# Patient Record
Sex: MALE | Race: BLACK OR AFRICAN AMERICAN | NOT HISPANIC OR LATINO | URBAN - METROPOLITAN AREA
[De-identification: names, ages, dates, MRNs, and addresses within clinical notes are randomized per-mention and may not be internally consistent; named-entity substitution may affect disease eponyms.]

---

## 2017-01-23 ENCOUNTER — EMERGENCY (EMERGENCY)
Facility: HOSPITAL | Age: 57
LOS: 1 days | Discharge: HOME | End: 2017-01-23
Attending: EMERGENCY MEDICINE | Admitting: EMERGENCY MEDICINE
Payer: COMMERCIAL

## 2017-01-23 VITALS
HEART RATE: 84 BPM | SYSTOLIC BLOOD PRESSURE: 116 MMHG | DIASTOLIC BLOOD PRESSURE: 74 MMHG | TEMPERATURE: 97 F | OXYGEN SATURATION: 98 % | RESPIRATION RATE: 16 BRPM

## 2017-01-23 DIAGNOSIS — R42 DIZZINESS AND GIDDINESS: ICD-10-CM

## 2017-01-23 DIAGNOSIS — R55 SYNCOPE AND COLLAPSE: ICD-10-CM

## 2017-01-23 DIAGNOSIS — Z79.899 OTHER LONG TERM (CURRENT) DRUG THERAPY: ICD-10-CM

## 2017-01-23 DIAGNOSIS — E11.9 TYPE 2 DIABETES MELLITUS WITHOUT COMPLICATIONS: ICD-10-CM

## 2017-01-23 LAB
ALBUMIN SERPL ELPH-MCNC: 3.8 G/DL — SIGNIFICANT CHANGE UP (ref 3.4–5)
ALP SERPL-CCNC: 95 U/L — SIGNIFICANT CHANGE UP (ref 40–120)
ALT FLD-CCNC: 116 U/L — HIGH (ref 12–42)
ANION GAP SERPL CALC-SCNC: 10 MMOL/L — SIGNIFICANT CHANGE UP (ref 9–16)
APPEARANCE UR: CLEAR — SIGNIFICANT CHANGE UP
AST SERPL-CCNC: 65 U/L — HIGH (ref 15–37)
BASOPHILS NFR BLD AUTO: 0.4 % — SIGNIFICANT CHANGE UP (ref 0–2)
BILIRUB SERPL-MCNC: 0.5 MG/DL — SIGNIFICANT CHANGE UP (ref 0.2–1.2)
BILIRUB UR-MCNC: NEGATIVE — SIGNIFICANT CHANGE UP
BUN SERPL-MCNC: 16 MG/DL — SIGNIFICANT CHANGE UP (ref 7–23)
CALCIUM SERPL-MCNC: 9 MG/DL — SIGNIFICANT CHANGE UP (ref 8.5–10.5)
CHLORIDE SERPL-SCNC: 100 MMOL/L — SIGNIFICANT CHANGE UP (ref 96–108)
CK SERPL-CCNC: 331 U/L — HIGH (ref 39–308)
CO2 SERPL-SCNC: 27 MMOL/L — SIGNIFICANT CHANGE UP (ref 22–31)
COLOR SPEC: YELLOW — SIGNIFICANT CHANGE UP
CREAT SERPL-MCNC: 1.15 MG/DL — SIGNIFICANT CHANGE UP (ref 0.5–1.3)
DIFF PNL FLD: NEGATIVE — SIGNIFICANT CHANGE UP
EOSINOPHIL NFR BLD AUTO: 1.5 % — SIGNIFICANT CHANGE UP (ref 0–6)
GLUCOSE SERPL-MCNC: 307 MG/DL — HIGH (ref 70–99)
GLUCOSE UR QL: >=1000
HCT VFR BLD CALC: 41 % — SIGNIFICANT CHANGE UP (ref 39–50)
HGB BLD-MCNC: 14.4 G/DL — SIGNIFICANT CHANGE UP (ref 13–17)
IMM GRANULOCYTES NFR BLD AUTO: 0.6 % — SIGNIFICANT CHANGE UP (ref 0–1.5)
KETONES UR-MCNC: NEGATIVE — SIGNIFICANT CHANGE UP
LEUKOCYTE ESTERASE UR-ACNC: NEGATIVE — SIGNIFICANT CHANGE UP
LYMPHOCYTES # BLD AUTO: 31.3 % — SIGNIFICANT CHANGE UP (ref 13–44)
MAGNESIUM SERPL-MCNC: 1.7 MG/DL — SIGNIFICANT CHANGE UP (ref 1.6–2.4)
MCHC RBC-ENTMCNC: 32.4 PG — SIGNIFICANT CHANGE UP (ref 27–34)
MCHC RBC-ENTMCNC: 35.1 G/DL — SIGNIFICANT CHANGE UP (ref 32–36)
MCV RBC AUTO: 92.1 FL — SIGNIFICANT CHANGE UP (ref 80–100)
MONOCYTES NFR BLD AUTO: 8.6 % — SIGNIFICANT CHANGE UP (ref 2–14)
NEUTROPHILS NFR BLD AUTO: 57.6 % — SIGNIFICANT CHANGE UP (ref 43–77)
NITRITE UR-MCNC: NEGATIVE — SIGNIFICANT CHANGE UP
NT-PROBNP SERPL-SCNC: 15 PG/ML — SIGNIFICANT CHANGE UP
PH UR: 5.5 — SIGNIFICANT CHANGE UP (ref 4–8.1)
PLATELET # BLD AUTO: 211 K/UL — SIGNIFICANT CHANGE UP (ref 150–400)
POTASSIUM SERPL-MCNC: 3.6 MMOL/L — SIGNIFICANT CHANGE UP (ref 3.5–5.3)
POTASSIUM SERPL-SCNC: 3.6 MMOL/L — SIGNIFICANT CHANGE UP (ref 3.5–5.3)
PROT SERPL-MCNC: 8.4 G/DL — HIGH (ref 6.4–8.2)
PROT UR-MCNC: 30 MG/DL
RBC # BLD: 4.45 M/UL — SIGNIFICANT CHANGE UP (ref 4.2–5.8)
RBC # FLD: 11.1 % — SIGNIFICANT CHANGE UP (ref 10.3–16.9)
SODIUM SERPL-SCNC: 137 MMOL/L — SIGNIFICANT CHANGE UP (ref 132–145)
SP GR SPEC: 1.02 — SIGNIFICANT CHANGE UP (ref 1–1.03)
TROPONIN I SERPL-MCNC: <0.017 NG/ML — LOW (ref 0.02–0.06)
UROBILINOGEN FLD QL: 0.2 E.U./DL — SIGNIFICANT CHANGE UP
WBC # BLD: 11.7 K/UL — HIGH (ref 3.8–10.5)
WBC # FLD AUTO: 11.7 K/UL — HIGH (ref 3.8–10.5)

## 2017-01-23 PROCEDURE — 99284 EMERGENCY DEPT VISIT MOD MDM: CPT

## 2017-01-23 PROCEDURE — 93010 ELECTROCARDIOGRAM REPORT: CPT | Mod: 77

## 2017-01-23 PROCEDURE — 93010 ELECTROCARDIOGRAM REPORT: CPT

## 2017-01-23 PROCEDURE — 99220: CPT | Mod: 25

## 2017-01-23 PROCEDURE — 70496 CT ANGIOGRAPHY HEAD: CPT | Mod: 26

## 2017-01-23 PROCEDURE — 71020: CPT | Mod: 26

## 2017-01-23 PROCEDURE — 70450 CT HEAD/BRAIN W/O DYE: CPT | Mod: 26,59

## 2017-01-23 RX ORDER — SODIUM CHLORIDE 9 MG/ML
1000 INJECTION INTRAMUSCULAR; INTRAVENOUS; SUBCUTANEOUS ONCE
Qty: 0 | Refills: 0 | Status: COMPLETED | OUTPATIENT
Start: 2017-01-23 | End: 2017-01-23

## 2017-01-23 RX ORDER — ONDANSETRON 8 MG/1
4 TABLET, FILM COATED ORAL ONCE
Qty: 0 | Refills: 0 | Status: COMPLETED | OUTPATIENT
Start: 2017-01-23 | End: 2017-01-23

## 2017-01-23 RX ORDER — KETOROLAC TROMETHAMINE 30 MG/ML
30 SYRINGE (ML) INJECTION ONCE
Qty: 0 | Refills: 0 | Status: DISCONTINUED | OUTPATIENT
Start: 2017-01-23 | End: 2017-01-23

## 2017-01-23 RX ADMIN — Medication 30 MILLIGRAM(S): at 22:05

## 2017-01-23 RX ADMIN — ONDANSETRON 4 MILLIGRAM(S): 8 TABLET, FILM COATED ORAL at 18:49

## 2017-01-23 RX ADMIN — Medication 30 MILLIGRAM(S): at 22:04

## 2017-01-23 RX ADMIN — SODIUM CHLORIDE 500 MILLILITER(S): 9 INJECTION INTRAMUSCULAR; INTRAVENOUS; SUBCUTANEOUS at 18:49

## 2017-01-23 RX ADMIN — SODIUM CHLORIDE 500 MILLILITER(S): 9 INJECTION INTRAMUSCULAR; INTRAVENOUS; SUBCUTANEOUS at 22:04

## 2017-01-23 NOTE — ED PROVIDER NOTE - MEDICAL DECISION MAKING DETAILS
near syncope, LH, no vertigo sx, mild nausea w/o vomiting, denies any pain, no cp, no sob, no hx of CAD or PE, will check labs, CT, fluids, reassess

## 2017-01-23 NOTE — ED PROVIDER NOTE - PROGRESS NOTE DETAILS
pt reevaluated after 2 L fluids, felt nauseous, sat upright, became unresponsive and passed out.  on bedside monitor pt HR down to 20-30s, appears to be in sinus, sx ~30 seconds, after which pt came about, ao x 3, does not recall what happened.  repeat EKG at bedside was sinus in 70s.  no sz activities in the ED, no hx of sz d/o. d/w transfer center and Dr. Cartagena, agrees reasonable for admission to cardiology w/ EP eval in the AM, however, there is no monitored bed available, and wants [facility] and have cardiology assess pt in the AM for disposition planning. possible monitored bed available, d/w Dr. Cartagena via transfer center, however, felt pt does need admission at the moment, and should continue obs at [facility] for cardiology in the AM, and per transfer center, no available monitored beds.

## 2017-01-23 NOTE — ED ADULT NURSE REASSESSMENT NOTE - NS ED NURSE REASSESS COMMENT FT1
Pt. had episode of near syncope while lying in his bed. Pt. wife reports pt. used the urinal and then began complaining of feeling "bad". Pt. became very lethargic and vomited. Pt. medicated for nausea and vomiting. Repeat Trop sent, repeat EKG and fingerstick completed. Pt. is awake but still lethargic and feeling very weak. Remains on continuous cardiac monitoring with O2via nasal cannula.

## 2017-01-23 NOTE — ED PROVIDER NOTE - PHYSICAL EXAMINATION
CON: pt had his eyes closed but answers questions, will open eyes when asked to, states feels weak, HENMT: clear oropharynx, soft neck, HEAD: atraumatic, CV: rrr, equal pulses b/l, RESP: cta b/l, GI: +BS, soft, nontender, no rebound, no guarding, SKIN: no rash, MSK: moving all extremities spontaneously, NEURO: limited assessment 2/2 general weakness

## 2017-01-23 NOTE — ED PROVIDER NOTE - OBJECTIVE STATEMENT
56 yom pw sudden onset of feeling LH, near syncope today at work, 430pm, accompanied by wife at bedside.  pt was at work (), felt sudden onset of sx, w/ nausea, no headache, no cp, no sob, no 56 yom pw sudden onset of feeling LH, near syncope today at work, 430pm, accompanied by wife at bedside.  pt was at work (), felt sudden onset of sx, w/ nausea, no headache, no cp, no sob, no vomiting, called wife.  hx of diabetes, on metformin.

## 2017-01-24 VITALS
HEART RATE: 74 BPM | RESPIRATION RATE: 18 BRPM | DIASTOLIC BLOOD PRESSURE: 60 MMHG | TEMPERATURE: 99 F | SYSTOLIC BLOOD PRESSURE: 113 MMHG | OXYGEN SATURATION: 95 %

## 2017-01-24 LAB
CK SERPL-CCNC: 266 U/L — SIGNIFICANT CHANGE UP (ref 39–308)
COHGB MFR BLDA: 1.2 % — SIGNIFICANT CHANGE UP
D DIMER BLD IA.RAPID-MCNC: 176 NG/ML DDU — SIGNIFICANT CHANGE UP
HGB BLDA-MCNC: 14 G/DL — SIGNIFICANT CHANGE UP (ref 13–17)
METHGB MFR BLDA: 1.1 % — SIGNIFICANT CHANGE UP
O2 CT VFR BLDA CALC: SIGNIFICANT CHANGE UP (ref 15–23)
OXYHGB MFR BLDA: 14 % — LOW (ref 94–100)
PCO2 BLDA: 53 MMHG — HIGH (ref 35–48)
PH BLDA: 7.33 — LOW (ref 7.35–7.45)
PO2 BLDA: 34 MMHG — CRITICAL LOW (ref 83–108)
SAO2 % BLDA: 62 % — LOW (ref 95–100)
TROPONIN I SERPL-MCNC: <0.017 NG/ML — LOW (ref 0.02–0.06)
TROPONIN I SERPL-MCNC: <0.017 NG/ML — LOW (ref 0.02–0.06)

## 2017-01-24 PROCEDURE — 71020: CPT | Mod: 26

## 2017-01-24 PROCEDURE — 99217: CPT

## 2017-01-24 RX ORDER — METFORMIN HYDROCHLORIDE 850 MG/1
1 TABLET ORAL
Qty: 0 | Refills: 0 | COMMUNITY

## 2017-01-24 RX ORDER — AZITHROMYCIN 500 MG/1
1 TABLET, FILM COATED ORAL
Qty: 4 | Refills: 0 | OUTPATIENT
Start: 2017-01-24 | End: 2017-01-28

## 2017-01-24 RX ORDER — AZITHROMYCIN 500 MG/1
500 TABLET, FILM COATED ORAL ONCE
Qty: 0 | Refills: 0 | Status: COMPLETED | OUTPATIENT
Start: 2017-01-24 | End: 2017-01-24

## 2017-01-24 RX ADMIN — AZITHROMYCIN 500 MILLIGRAM(S): 500 TABLET, FILM COATED ORAL at 11:00

## 2017-01-24 NOTE — ED CDU PROVIDER NOTE - MEDICAL DECISION MAKING DETAILS
repeat troponin, and cardiology consult repeat troponin, and cardiology consult    I have discussed the discharge plan with the patient. The patient agrees with the plan, as discussed.  The patient understands Emergency Department diagnosis is a preliminary diagnosis often based on limited information and that the patient must adhere to the follow-up plan as discussed.  The patient understands that if the symptoms worsen or if prescribed medications do not have the desired/planned effect that the patient may return to the Emergency Department at any time for further evaluation and treatment.

## 2017-01-24 NOTE — ED ADULT NURSE REASSESSMENT NOTE - NS ED NURSE REASSESS COMMENT FT1
repeat labs drawn and sent.  no complaints noted.  resps even and unlabored.  cardiac monitor = nsr with no ectopy.  family asleep at bedside.

## 2017-01-24 NOTE — CONSULT NOTE ADULT - SUBJECTIVE AND OBJECTIVE BOX
[facility] ([facility]) ER CARDIOLOGY CONSULT NOTE    Patient is a 56y old  Male who presents with a chief complaint of near syncope    HPI:56y Male with past medical history of DM-2 p/w episode of near syncope.  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  Afterwards, he began to feel lightheaded and also had some chest tightness.  He denied any syncope or SOB.  He was brought to [facility] ER and EKG initially showed NSR at 64 bpm with 1st degree AV block and non specific ST/T wave abnormalities with a BP of 116/74.  He received 2 L of IVF, however, he continued to feel lightheaded.  He had 2 separate episodes of nausea that were followed by syncope (brief about 30 seconds- no seizure activity).  One episode was accompanied by sinus bradycardia on the monitor down to a HR of 20s-30s per ER staff (no tele strips available).  Pt also noted to have decreased O2 sats to 88% when sleeping/snoring.      Pt now feels tired, but overall better than yesterday.  Denies any prior cardiac testing.  Denies any limitations walking, although he does not exercise.    REVIEW OF SYSTEMS: Denies palpitations, change in exercise tolerance, orthopnea, PND, claudication, or edema    PAST MEDICAL & SURGICAL HISTORY:  Diabetes  Ventral hernia    No Known Drug Allergies    MEDICATIONS  (home):  metformin 500 mg PO bid    SOCIAL HISTORY:  Tobacco: none  Alcohol: none  Drugs: none  Occupation:     FAMILY HISTORY:  No pertinent family history in first degree relatives    Vital Signs Last 24 Hrs  T(C): 37.1, Max: 37.1 ( @ 06:35)  T(F): 98.8, Max: 98.8 ( @ 06:35)  HR: 87 (68 - 98)  BP: 130/78 (114/60 - 155/86)  BP(mean): --  RR: 18 (16 - 18)  SpO2: 98% (94% - 99%)  PHYSICAL EXAM:  General: NAD, sleepy but easily arousable  HEENT: NCAT, MMM, no icterus  Neck: no JVD, no carotid bruits  Cardiovascular: S1 quiet S2 loud, RRR, no MRG  Lungs: CTABL, no WRR  Abdominal: +BS, obese, distended, soft, non-tender, no RGR  Extremities: warm, dry, pulses 2+ x 4 ext, no varicosities  Skin: no rash, bruising, or bleeding  Neuro: AAOx3, no FND    ECG: NSR with sinus arrhythmia 64 bpm, 1st deg AVB, non spec ST/T wave abnormalities.  #2 NSR 73 bpm 1st deg AVB, non spec ST/T wave abnormalities  TELEMETRY: no events   BEDSIDE ECHO FINDINGS:  EF 55-60%, technically difficult study, no gross wall motion abnormalities, no gross valve abnormalities, no pericardial effusion, IVC not visualized  LABS:                        14.4   11.7  )-----------( 211      ( 2017 18:45 )             41.0     2017 18:45    137    |  100    |  16     ----------------------------<  307    3.6     |  27     |  1.15     Ca    9.0        2017 18:45  Mg     1.7       2017 18:45    TPro  8.4    /  Alb  3.8    /  TBili  0.5    /  DBili  x      /  AST  65     /  ALT  116    /  AlkPhos  95     2017 18:45    CARDIAC MARKERS ( 2017 05:20 )  <0.017 ng/mL / x     / 266 U/L / x     / x      CARDIAC MARKERS ( 2017 00:01 )  <0.017 ng/mL / x     / x     / x     / x      CARDIAC MARKERS ( 2017 18:45 )  <0.017 ng/mL / x     / 331 U/L / x     / x        Urinalysis Basic - ( 2017 22:10 )    Color: Yellow / Appearance: Clear / S.020 / pH: x  Gluc: x / Ketone: NEGATIVE  / Bili: NEGATIVE / Urobili: 0.2 E.U./dL   Blood: x / Protein: 30 mg/dL / Nitrite: NEGATIVE   Leuk Esterase: NEGATIVE / RBC: < 5 /HPF / WBC < 5 /HPF   Sq Epi: x / Non Sq Epi: Few /HPF / Bacteria: Present /HPF    RADIOLOGY & ADDITIONAL STUDIES:    PRIOR CARDIAC TESTING: denies    ASSESSMENT:  56y Male with past medical history of DM-2 p/w episode of near syncope/syncope    PLAN:  1.  Syncope - likely vasovagal in nature given nausea, and sinus bradycardia seen by ER staff during episode.  Recommend rest, IVF, PO intake as tolerated.  Patient should f/u with me in 1 week and will plan to pursue formal echo as outpatient.    2.  Chest tightness - troponin <0.017 x 3, doubt ACS.  3.  Hypoxia during sleep - may represent obstructive sleep apnea, pt admits to daytime somnolence.  Will refer for evaluation with Dr. Ramesh (ENT) for sleep evaluation as outpatient.  Check CXR PA/lateral.  4.  Diabetes - followed by patient's primary MD.  5.  Transaminitis - follow up with primary MD. [facility] ([facility]) ER CARDIOLOGY CONSULT NOTE    Patient is a 56y old  Male who presents with a chief complaint of near syncope    HPI:56y Male with past medical history of DM-2 p/w episode of near syncope.  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  Afterwards, he began to feel lightheaded and also had some chest tightness.  He denied any syncope or SOB.  He was brought to [facility] ER and EKG initially showed NSR at 64 bpm with 1st degree AV block and non specific ST/T wave abnormalities with a BP of 116/74.  He received 2 L of IVF, however, he continued to feel lightheaded.  He had 2 separate episodes of nausea that were followed by syncope (brief about 30 seconds- no seizure activity).  One episode was accompanied by sinus bradycardia on the monitor down to a HR of 20s-30s per ER staff (no tele strips available).  Pt also noted to have decreased O2 sats to 88% when sleeping/snoring.      Pt now feels tired, but overall better than yesterday.  Denies any prior cardiac testing.  Denies any limitations walking, although he does not exercise.    REVIEW OF SYSTEMS: Denies palpitations, change in exercise tolerance, orthopnea, PND, claudication, or edema    PAST MEDICAL & SURGICAL HISTORY:  Diabetes  Ventral hernia    No Known Drug Allergies    MEDICATIONS  (home):  metformin 500 mg PO bid    SOCIAL HISTORY:  Tobacco: none  Alcohol: none  Drugs: none  Occupation:     FAMILY HISTORY:  No pertinent family history in first degree relatives    Vital Signs Last 24 Hrs  T(C): 37.1, Max: 37.1 ( @ 06:35)  T(F): 98.8, Max: 98.8 ( @ 06:35)  HR: 87 (68 - 98)  BP: 130/78 (114/60 - 155/86)  BP(mean): --  RR: 18 (16 - 18)  SpO2: 98% (94% - 99%)  PHYSICAL EXAM:  General: NAD, sleepy but easily arousable  HEENT: NCAT, MMM, no icterus  Neck: no JVD, no carotid bruits  Cardiovascular: S1 quiet S2 loud, RRR, no MRG  Lungs: CTABL, no WRR  Abdominal: +BS, obese, distended, soft, non-tender, no RGR  Extremities: warm, dry, pulses 2+ x 4 ext, no varicosities  Skin: no rash, bruising, or bleeding  Neuro: AAOx3, no FND    ECG: NSR with sinus arrhythmia 64 bpm, 1st deg AVB, non spec ST/T wave abnormalities.  #2 NSR 73 bpm 1st deg AVB, non spec ST/T wave abnormalities  TELEMETRY: no events   BEDSIDE ECHO FINDINGS:  EF 55-60%, technically difficult study, no gross wall motion abnormalities, no gross valve abnormalities, no pericardial effusion, IVC not visualized  LABS:                        14.4   11.7  )-----------( 211      ( 2017 18:45 )             41.0     2017 18:45    137    |  100    |  16     ----------------------------<  307    3.6     |  27     |  1.15     Ca    9.0        2017 18:45  Mg     1.7       2017 18:45    TPro  8.4    /  Alb  3.8    /  TBili  0.5    /  DBili  x      /  AST  65     /  ALT  116    /  AlkPhos  95     2017 18:45    CARDIAC MARKERS ( 2017 05:20 )  <0.017 ng/mL / x     / 266 U/L / x     / x      CARDIAC MARKERS ( 2017 00:01 )  <0.017 ng/mL / x     / x     / x     / x      CARDIAC MARKERS ( 2017 18:45 )  <0.017 ng/mL / x     / 331 U/L / x     / x        Urinalysis Basic - ( 2017 22:10 )    Color: Yellow / Appearance: Clear / S.020 / pH: x  Gluc: x / Ketone: NEGATIVE  / Bili: NEGATIVE / Urobili: 0.2 E.U./dL   Blood: x / Protein: 30 mg/dL / Nitrite: NEGATIVE   Leuk Esterase: NEGATIVE / RBC: < 5 /HPF / WBC < 5 /HPF   Sq Epi: x / Non Sq Epi: Few /HPF / Bacteria: Present /HPF    RADIOLOGY & ADDITIONAL STUDIES: CT head : normal, CT angio brain: no hemodynamically significant stenosis    PRIOR CARDIAC TESTING: denies    ASSESSMENT:  56y Male with past medical history of DM-2 p/w episode of near syncope/syncope    PLAN:  1.  Syncope - likely vasovagal in nature given nausea, and sinus bradycardia seen by ER staff during episode.  Recommend rest, IVF, PO intake as tolerated.  Patient should f/u with me in 1 week and will plan to pursue formal echo as outpatient.  CT head unremarkable.  2.  Chest tightness - troponin <0.017 x 3, doubt ACS.  3.  Hypoxia during sleep - may represent obstructive sleep apnea, pt admits to daytime somnolence.  Will refer for evaluation with Dr. Ramesh (ENT) for sleep evaluation as outpatient.  Check CXR PA/lateral.  4.  Diabetes - followed by patient's primary MD.  5.  Transaminitis - follow up with primary MD. [facility] ([facility]) ER CARDIOLOGY CONSULT NOTE    Patient is a 56y old  Male who presents with a chief complaint of near syncope    HPI:56y Male with past medical history of DM-2 p/w episode of near syncope.  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  Afterwards, he began to feel lightheaded and also had some chest tightness.  He denied any syncope or SOB.  He was brought to [facility] ER and EKG initially showed NSR at 64 bpm with 1st degree AV block and non specific ST/T wave abnormalities with a BP of 116/74.  He received 2 L of IVF, however, he continued to feel lightheaded.  He had 2 separate episodes of nausea that were followed by syncope (brief about 30 seconds- no seizure activity).  One episode was accompanied by sinus bradycardia on the monitor down to a HR of 20s-30s per ER staff (no tele strips available).  Pt also noted to have decreased O2 sats to 88% when sleeping/snoring.      Pt now feels tired, but overall better than yesterday.  Denies any prior cardiac testing.  Denies any limitations walking, although he does not exercise.    REVIEW OF SYSTEMS: Denies palpitations, change in exercise tolerance, orthopnea, PND, claudication, or edema    PAST MEDICAL & SURGICAL HISTORY:  Diabetes  Ventral hernia    No Known Drug Allergies    MEDICATIONS  (home):  metformin 500 mg PO bid    SOCIAL HISTORY:  Tobacco: none  Alcohol: none  Drugs: none  Occupation:     FAMILY HISTORY:  No pertinent family history in first degree relatives    Vital Signs Last 24 Hrs  T(C): 37.1, Max: 37.1 ( @ 06:35)  T(F): 98.8, Max: 98.8 ( @ 06:35)  HR: 87 (68 - 98)  BP: 130/78 (114/60 - 155/86)  BP(mean): --  RR: 18 (16 - 18)  SpO2: 98% (94% - 99%)  PHYSICAL EXAM:  General: NAD, sleepy but easily arousable  HEENT: NCAT, MMM, no icterus  Neck: no JVD, no carotid bruits  Cardiovascular: S1 quiet S2 loud, RRR, no MRG  Lungs: CTABL, no WRR  Abdominal: +BS, obese, distended, soft, non-tender, no RGR  Extremities: warm, dry, pulses 2+ x 4 ext, no varicosities  Skin: no rash, bruising, or bleeding  Neuro: AAOx3, no FND    ECG: NSR with sinus arrhythmia 64 bpm, 1st deg AVB, non spec ST/T wave abnormalities.  #2 NSR 73 bpm 1st deg AVB, non spec ST/T wave abnormalities  TELEMETRY: no events   BEDSIDE ECHO FINDINGS:  EF 55-60%, technically difficult study, no gross wall motion abnormalities, no gross valve abnormalities, no pericardial effusion, IVC not visualized  LABS:                        14.4   11.7  )-----------( 211      ( 2017 18:45 )             41.0     2017 18:45    137    |  100    |  16     ----------------------------<  307    3.6     |  27     |  1.15     Ca    9.0        2017 18:45  Mg     1.7       2017 18:45    TPro  8.4    /  Alb  3.8    /  TBili  0.5    /  DBili  x      /  AST  65     /  ALT  116    /  AlkPhos  95     2017 18:45    CARDIAC MARKERS ( 2017 05:20 )  <0.017 ng/mL / x     / 266 U/L / x     / x      CARDIAC MARKERS ( 2017 00:01 )  <0.017 ng/mL / x     / x     / x     / x      CARDIAC MARKERS ( 2017 18:45 )  <0.017 ng/mL / x     / 331 U/L / x     / x        Urinalysis Basic - ( 2017 22:10 )    Color: Yellow / Appearance: Clear / S.020 / pH: x  Gluc: x / Ketone: NEGATIVE  / Bili: NEGATIVE / Urobili: 0.2 E.U./dL   Blood: x / Protein: 30 mg/dL / Nitrite: NEGATIVE   Leuk Esterase: NEGATIVE / RBC: < 5 /HPF / WBC < 5 /HPF   Sq Epi: x / Non Sq Epi: Few /HPF / Bacteria: Present /HPF    RADIOLOGY & ADDITIONAL STUDIES: CT head : normal, CT angio brain: no hemodynamically significant stenosis    PRIOR CARDIAC TESTING: denies    ASSESSMENT:  56y Male with past medical history of DM-2 p/w episode of near syncope/syncope    PLAN:  1.  Syncope - likely vasovagal in nature given nausea, and sinus bradycardia seen by ER staff during episode.  Recommend rest, IVF, PO intake as tolerated.  Patient should f/u with me in 1 week and will plan to pursue formal echo as outpatient.  CT head unremarkable.  2.  Chest tightness - troponin <0.017 x 3, doubt ACS.  3.  Hypoxia during sleep - sats to 88% on pulse ox in ER while sleeping.  May represent obstructive sleep apnea, pt admits to daytime somnolence.  Will refer for evaluation with Dr. Ramesh (ENT) for sleep evaluation as outpatient.  Check CXR PA/lateral.  4.  Diabetes - followed by patient's primary MD, on metformin.  5.  Transaminitis - no abdominal pain.  Follow up with primary MD for repeat LFTs. [facility] ([facility]) ER CARDIOLOGY CONSULT NOTE    Patient is a 56y old  Male who presents with a chief complaint of near syncope    HPI:56y Male with past medical history of DM-2 p/w episode of near syncope.  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  Afterwards, he began to feel lightheaded and also had some chest tightness.  He denied any syncope or SOB.  He was brought to [facility] ER and EKG initially showed NSR at 64 bpm with 1st degree AV block and non specific ST/T wave abnormalities with a BP of 116/74.  He received 2 L of IVF, however, he continued to feel lightheaded.  He had 2 separate episodes of nausea that were followed by syncope (brief about 30 seconds- no seizure activity).  One episode was accompanied by sinus bradycardia on the monitor down to a HR of 20s-30s per ER staff (no tele strips available).  Pt also noted to have decreased O2 sats to 88% when sleeping/snoring.      Pt now feels tired, but overall better than yesterday.  Denies any prior cardiac testing.  Denies any limitations walking, although he does not exercise.    REVIEW OF SYSTEMS: Denies palpitations, change in exercise tolerance, orthopnea, PND, claudication, or edema    PAST MEDICAL & SURGICAL HISTORY:  Diabetes  Ventral hernia    No Known Drug Allergies    MEDICATIONS  (home):  metformin 500 mg PO bid    SOCIAL HISTORY:  Tobacco: none  Alcohol: none  Drugs: none  Occupation:     FAMILY HISTORY:  No pertinent family history in first degree relatives    Vital Signs Last 24 Hrs  T(C): 37.1, Max: 37.1 ( @ 06:35)  T(F): 98.8, Max: 98.8 ( @ 06:35)  HR: 87 (68 - 98)  BP: 130/78 (114/60 - 155/86)  BP(mean): --  RR: 18 (16 - 18)  SpO2: 98% (94% - 99%)  PHYSICAL EXAM:  General: NAD, sleepy but easily arousable  HEENT: NCAT, MMM, no icterus  Neck: no JVD, no carotid bruits  Cardiovascular: S1 quiet S2 loud, RRR, no MRG  Lungs: CTABL, no WRR  Abdominal: +BS, obese, distended, soft, non-tender, no RGR  Extremities: warm, dry, pulses 2+ x 4 ext, no varicosities  Skin: no rash, bruising, or bleeding  Neuro: AAOx3, no FND    ECG: NSR with sinus arrhythmia 64 bpm, 1st deg AVB, non spec ST/T wave abnormalities.  #2 NSR 73 bpm 1st deg AVB, non spec ST/T wave abnormalities  TELEMETRY: no events   BEDSIDE ECHO FINDINGS:  EF 55-60%, technically difficult study, no gross wall motion abnormalities, no gross valve abnormalities, no pericardial effusion, IVC not visualized  LABS:                        14.4   11.7  )-----------( 211      ( 2017 18:45 )             41.0     2017 18:45    137    |  100    |  16     ----------------------------<  307    3.6     |  27     |  1.15     Ca    9.0        2017 18:45  Mg     1.7       2017 18:45    TPro  8.4    /  Alb  3.8    /  TBili  0.5    /  DBili  x      /  AST  65     /  ALT  116    /  AlkPhos  95     2017 18:45    CARDIAC MARKERS ( 2017 05:20 )  <0.017 ng/mL / x     / 266 U/L / x     / x      CARDIAC MARKERS ( 2017 00:01 )  <0.017 ng/mL / x     / x     / x     / x      CARDIAC MARKERS ( 2017 18:45 )  <0.017 ng/mL / x     / 331 U/L / x     / x        Urinalysis Basic - ( 2017 22:10 )    Color: Yellow / Appearance: Clear / S.020 / pH: x  Gluc: x / Ketone: NEGATIVE  / Bili: NEGATIVE / Urobili: 0.2 E.U./dL   Blood: x / Protein: 30 mg/dL / Nitrite: NEGATIVE   Leuk Esterase: NEGATIVE / RBC: < 5 /HPF / WBC < 5 /HPF   Sq Epi: x / Non Sq Epi: Few /HPF / Bacteria: Present /HPF    RADIOLOGY & ADDITIONAL STUDIES: CT head : normal, CT angio brain: no hemodynamically significant stenosis    PRIOR CARDIAC TESTING: denies    ASSESSMENT:  56y Male with past medical history of DM-2 p/w episode of near syncope/syncope    PLAN:  1.  Syncope - likely vasovagal in nature given nausea, and sinus bradycardia seen by ER staff during episode.  Recommend rest, IVF, PO intake as tolerated.  Patient should f/u with me in 1 week and will plan to pursue formal echo as outpatient.  CT head unremarkable.  2.  Chest tightness - troponin <0.017 x 3, doubt ACS.  Check lipids as outpatient, and will likely recommend aspirin 81 mg PO qd for primary prevention.  3.  Hypoxia during sleep - sats to 88% on pulse ox in ER while sleeping.  May represent obstructive sleep apnea, pt admits to daytime somnolence.  Will refer for evaluation with Dr. Ramesh (ENT) for sleep evaluation as outpatient.  Check CXR PA/lateral.  4.  Diabetes - followed by patient's primary MD, on metformin.  5.  Transaminitis - no abdominal pain.  Follow up with primary MD for repeat LFTs.

## 2017-01-24 NOTE — ED CDU PROVIDER NOTE - PROGRESS NOTE DETAILS
repeat trop and ekg wnl. Sign out accepted from Dr. Lowe.  Pt is being re-evaluated by Dr. Armenta of cardiology at bedside now.  Will f/u cardiology recommendations and re-evaluate pt.  Cards reports overnight tele was uneventful. Pt evaluated by Dr. Armenta.  he believes pt can be discharged home.   He would like a CXR for completion sake and to r/o any early PNA.  Pt was walked and had no further SOB, CP, or dizziness.  Pt reports he feels much better and is comfortable with D/C plan.  He will f/u with Dr. Armenta on Tuesday. CXR - possible atelectasis vs early consolidation to RLL - discussed with radiologist.  Given episode yesterday will treat with Azithromycin and have him f/u for repeat CXR with Dr. Armenta.

## 2017-01-24 NOTE — ED CDU PROVIDER NOTE - OBJECTIVE STATEMENT
56 yom pw sudden onset of feeling LH, near syncope today at work, 430pm, accompanied by wife at bedside.  pt was at work (), felt sudden onset of sx, w/ nausea, no headache, no cp, no sob, no vomiting, called wife.  hx of diabetes, on metformin.

## 2020-01-29 NOTE — ED CDU PROVIDER NOTE - PHYSICAL EXAMINATION
Detail Level: Simple CON: pt had his eyes closed but answers questions, will open eyes when asked to, states feels weak, HENMT: clear oropharynx, soft neck, HEAD: atraumatic, CV: rrr, equal pulses b/l, RESP: cta b/l, GI: +BS, soft, nontender, no rebound, no guarding, SKIN: no rash, MSK: no edema noted, NEURO: ao x 3, follows commands, conversing appropriately, moving all extremities spontaneously,

## 2021-08-19 NOTE — ED ADULT NURSE NOTE - CHIEF COMPLAINT QUOTE
Pt with dizziness at work, no facial droop, no slurred speech Patient/Caregiver provided printed discharge information.

## 2023-06-06 NOTE — ED CDU PROVIDER NOTE - CHIEF COMPLAINT
The patient is a 56y Male complaining of dizziness. Glycopyrrolate Pregnancy And Lactation Text: This medication is Pregnancy Category B and is considered safe during pregnancy. It is unknown if it is excreted breast milk.